# Patient Record
Sex: FEMALE | Race: WHITE
[De-identification: names, ages, dates, MRNs, and addresses within clinical notes are randomized per-mention and may not be internally consistent; named-entity substitution may affect disease eponyms.]

---

## 2020-01-04 ENCOUNTER — HOSPITAL ENCOUNTER (OUTPATIENT)
Dept: HOSPITAL 95 - LAB SHORT | Age: 40
Discharge: HOME | End: 2020-01-04
Attending: PHYSICIAN ASSISTANT
Payer: COMMERCIAL

## 2020-01-04 DIAGNOSIS — N39.0: Primary | ICD-10-CM

## 2021-09-10 ENCOUNTER — HOSPITAL ENCOUNTER (OUTPATIENT)
Dept: HOSPITAL 95 - LAB SHORT | Age: 41
Discharge: HOME | End: 2021-09-10
Attending: PHYSICIAN ASSISTANT
Payer: COMMERCIAL

## 2021-09-10 DIAGNOSIS — N39.0: Primary | ICD-10-CM

## 2022-03-19 ENCOUNTER — HOSPITAL ENCOUNTER (OUTPATIENT)
Dept: HOSPITAL 95 - LAB SHORT | Age: 42
End: 2022-03-19
Attending: GENERAL PRACTICE
Payer: COMMERCIAL

## 2022-03-19 DIAGNOSIS — R58: Primary | ICD-10-CM

## 2022-03-19 LAB
ALBUMIN SERPL BCP-MCNC: 3.6 G/DL (ref 3.4–5)
ALBUMIN/GLOB SERPL: 0.9 {RATIO} (ref 0.8–1.8)
ALT SERPL W P-5'-P-CCNC: 15 U/L (ref 12–78)
ANION GAP SERPL CALCULATED.4IONS-SCNC: 7 MMOL/L (ref 6–16)
AST SERPL W P-5'-P-CCNC: 9 U/L (ref 12–37)
BASOPHILS # BLD AUTO: 0.04 K/MM3 (ref 0–0.23)
BASOPHILS NFR BLD AUTO: 1 % (ref 0–2)
BILIRUB SERPL-MCNC: 0.3 MG/DL (ref 0.1–1)
BUN SERPL-MCNC: 14 MG/DL (ref 8–24)
CALCIUM SERPL-MCNC: 8.9 MG/DL (ref 8.5–10.1)
CHLORIDE SERPL-SCNC: 106 MMOL/L (ref 98–108)
CO2 SERPL-SCNC: 27 MMOL/L (ref 21–32)
CREAT SERPL-MCNC: 1.03 MG/DL (ref 0.4–1)
DEPRECATED RDW RBC AUTO: 43.6 FL (ref 35.1–46.3)
EOSINOPHIL # BLD AUTO: 0.08 K/MM3 (ref 0–0.68)
EOSINOPHIL NFR BLD AUTO: 1 % (ref 0–6)
ERYTHROCYTE [DISTWIDTH] IN BLOOD BY AUTOMATED COUNT: 13.3 % (ref 11.7–14.2)
GLOBULIN SER CALC-MCNC: 3.8 G/DL (ref 2.2–4)
GLUCOSE SERPL-MCNC: 88 MG/DL (ref 70–99)
HCT VFR BLD AUTO: 39.1 % (ref 33–51)
HGB BLD-MCNC: 12.7 G/DL (ref 11.5–16)
IMM GRANULOCYTES # BLD AUTO: 0.01 K/MM3 (ref 0–0.1)
IMM GRANULOCYTES NFR BLD AUTO: 0 % (ref 0–1)
LYMPHOCYTES # BLD AUTO: 1.74 K/MM3 (ref 0.84–5.2)
LYMPHOCYTES NFR BLD AUTO: 23 % (ref 21–46)
MCHC RBC AUTO-ENTMCNC: 32.5 G/DL (ref 31.5–36.5)
MCV RBC AUTO: 90 FL (ref 80–100)
MONOCYTES # BLD AUTO: 0.58 K/MM3 (ref 0.16–1.47)
MONOCYTES NFR BLD AUTO: 8 % (ref 4–13)
NEUTROPHILS # BLD AUTO: 5.15 K/MM3 (ref 1.96–9.15)
NEUTROPHILS NFR BLD AUTO: 68 % (ref 41–73)
NRBC # BLD AUTO: 0 K/MM3 (ref 0–0.02)
NRBC BLD AUTO-RTO: 0 /100 WBC (ref 0–0.2)
PLATELET # BLD AUTO: 263 K/MM3 (ref 150–400)
POTASSIUM SERPL-SCNC: 4.1 MMOL/L (ref 3.5–5.5)
PROT SERPL-MCNC: 7.4 G/DL (ref 6.4–8.2)
PROTHROMBIN TIME: 10.8 SEC (ref 9.7–11.5)
SODIUM SERPL-SCNC: 140 MMOL/L (ref 136–145)

## 2022-05-11 ENCOUNTER — HOSPITAL ENCOUNTER (OUTPATIENT)
Dept: HOSPITAL 95 - LAB | Age: 42
Discharge: HOME | End: 2022-05-11
Attending: SPECIALIST
Payer: COMMERCIAL

## 2022-05-11 DIAGNOSIS — E78.00: ICD-10-CM

## 2022-05-11 DIAGNOSIS — D50.9: ICD-10-CM

## 2022-05-11 DIAGNOSIS — D52.8: ICD-10-CM

## 2022-05-11 DIAGNOSIS — R76.9: ICD-10-CM

## 2022-05-11 DIAGNOSIS — R94.6: ICD-10-CM

## 2022-05-11 DIAGNOSIS — N18.2: Primary | ICD-10-CM

## 2022-05-11 DIAGNOSIS — D63.1: ICD-10-CM

## 2022-05-11 DIAGNOSIS — E55.9: ICD-10-CM

## 2022-05-11 DIAGNOSIS — D51.8: ICD-10-CM

## 2022-05-11 DIAGNOSIS — R94.5: ICD-10-CM

## 2022-05-11 DIAGNOSIS — N25.81: ICD-10-CM

## 2022-05-11 LAB
CREAT UR-MCNC: 68.1 MG/DL (ref 27–270)
MICROALBUMIN UR-MCNC: <5 MG/L (ref 0–20)
PROT UR-MCNC: <5 MG/DL (ref 0–11.9)

## 2022-10-12 ENCOUNTER — HOSPITAL ENCOUNTER (OUTPATIENT)
Dept: HOSPITAL 95 - LAB SHORT | Age: 42
Discharge: HOME | End: 2022-10-12
Attending: PHYSICIAN ASSISTANT
Payer: COMMERCIAL

## 2022-10-12 DIAGNOSIS — N39.0: Primary | ICD-10-CM

## 2022-11-04 ENCOUNTER — HOSPITAL ENCOUNTER (OUTPATIENT)
Dept: HOSPITAL 95 - LAB | Age: 42
Discharge: HOME | End: 2022-11-04
Attending: PHYSICIAN ASSISTANT
Payer: COMMERCIAL

## 2022-11-04 DIAGNOSIS — Z01.419: Primary | ICD-10-CM

## 2022-11-09 LAB — OTHER STN SPEC: (no result)

## 2025-02-20 NOTE — NUR
Ambulatory in Day Surgery. History, Chart, Medications and Allergies reviewed
before start of procedure. Lungs clear T/O to Auscultation. Patient confirms
NPO status and agrees with scheduled surgery. Pre-Op teaching done. Pt
verbalizes understanding. Patient States Post-Procedure ride home has been
arranged. PT BELONGINGS PLACED UNDERNEATH GURNEY FOR SAFEKEEPING. PT MOM AT
BEDSIDE. WILL TAKE PT GLASSES TO PACU ONCE SHE ROLLS BACK TO OR.

## 2025-02-20 NOTE — NUR
DISCHARGE SUMMARY:
PT D/C HOME W/MOM TO BE . INCISIONS CDI, ABD SOFT TO PALP. PT HAVING
SMALL AMT VAGINAL BLEEDING, VOIDED LIGHT YELLOW URINE, PVR BLADDER SCAN 0. PT
REP PAIN MINIMAL, DECLINED NEED FOR PAIN MEDS. PT AMB INDEP, MILA WELL. DC
INSTRUCTIONS AND F/U CARE REVIEWED W/PT AND DC INSTRUCTIONS SENT HOME. NO
DISTRESS NOTED AT TIME OF DC.